# Patient Record
Sex: MALE | Race: WHITE | NOT HISPANIC OR LATINO | Employment: OTHER | URBAN - METROPOLITAN AREA
[De-identification: names, ages, dates, MRNs, and addresses within clinical notes are randomized per-mention and may not be internally consistent; named-entity substitution may affect disease eponyms.]

---

## 2019-05-16 ENCOUNTER — TELEPHONE (OUTPATIENT)
Dept: ENDOCRINOLOGY | Facility: CLINIC | Age: 29
End: 2019-05-16

## 2019-05-16 NOTE — TELEPHONE ENCOUNTER
----- Message from Alma Lucero sent at 5/15/2019  4:24 PM CDT -----  Contact: Yaima / Mendoza [phone]  Trans PT   PT called to schedule a NP appointment.   Self-Pay.

## 2019-09-19 ENCOUNTER — OFFICE VISIT (OUTPATIENT)
Dept: ENDOCRINOLOGY | Facility: CLINIC | Age: 29
End: 2019-09-19

## 2019-09-19 ENCOUNTER — LAB VISIT (OUTPATIENT)
Dept: LAB | Facility: HOSPITAL | Age: 29
End: 2019-09-19
Attending: INTERNAL MEDICINE

## 2019-09-19 VITALS
HEART RATE: 74 BPM | HEIGHT: 78 IN | BODY MASS INDEX: 24.03 KG/M2 | WEIGHT: 207.69 LBS | SYSTOLIC BLOOD PRESSURE: 124 MMHG | DIASTOLIC BLOOD PRESSURE: 90 MMHG

## 2019-09-19 LAB
ALBUMIN SERPL BCP-MCNC: 4.3 G/DL (ref 3.5–5.2)
ALP SERPL-CCNC: 71 U/L (ref 55–135)
ALT SERPL W/O P-5'-P-CCNC: 14 U/L (ref 10–44)
ANION GAP SERPL CALC-SCNC: 12 MMOL/L (ref 8–16)
AST SERPL-CCNC: 15 U/L (ref 10–40)
BILIRUB SERPL-MCNC: 0.5 MG/DL (ref 0.1–1)
BUN SERPL-MCNC: 9 MG/DL (ref 6–20)
CALCIUM SERPL-MCNC: 9.8 MG/DL (ref 8.7–10.5)
CHLORIDE SERPL-SCNC: 106 MMOL/L (ref 95–110)
CHOLEST SERPL-MCNC: 193 MG/DL (ref 120–199)
CHOLEST/HDLC SERPL: 4.1 {RATIO} (ref 2–5)
CO2 SERPL-SCNC: 24 MMOL/L (ref 23–29)
CREAT SERPL-MCNC: 1 MG/DL (ref 0.5–1.4)
ERYTHROCYTE [DISTWIDTH] IN BLOOD BY AUTOMATED COUNT: 11.3 % (ref 11.5–14.5)
EST. GFR  (AFRICAN AMERICAN): >60 ML/MIN/1.73 M^2
EST. GFR  (NON AFRICAN AMERICAN): >60 ML/MIN/1.73 M^2
GLUCOSE SERPL-MCNC: 98 MG/DL (ref 70–110)
HCT VFR BLD AUTO: 45.3 % (ref 40–54)
HDLC SERPL-MCNC: 47 MG/DL (ref 40–75)
HDLC SERPL: 24.4 % (ref 20–50)
HGB BLD-MCNC: 15 G/DL (ref 14–18)
LDLC SERPL CALC-MCNC: 119 MG/DL (ref 63–159)
MCH RBC QN AUTO: 31.6 PG (ref 27–31)
MCHC RBC AUTO-ENTMCNC: 33.1 G/DL (ref 32–36)
MCV RBC AUTO: 95 FL (ref 82–98)
NONHDLC SERPL-MCNC: 146 MG/DL
PLATELET # BLD AUTO: 197 K/UL (ref 150–350)
PMV BLD AUTO: 10.8 FL (ref 9.2–12.9)
POTASSIUM SERPL-SCNC: 4.2 MMOL/L (ref 3.5–5.1)
PROT SERPL-MCNC: 7.5 G/DL (ref 6–8.4)
RBC # BLD AUTO: 4.75 M/UL (ref 4.6–6.2)
SODIUM SERPL-SCNC: 142 MMOL/L (ref 136–145)
TRIGL SERPL-MCNC: 135 MG/DL (ref 30–150)
WBC # BLD AUTO: 5.55 K/UL (ref 3.9–12.7)

## 2019-09-19 PROCEDURE — 99999 PR PBB SHADOW E&M-EST. PATIENT-LVL III: ICD-10-PCS | Mod: PBBFAC,,, | Performed by: INTERNAL MEDICINE

## 2019-09-19 PROCEDURE — 80053 COMPREHEN METABOLIC PANEL: CPT

## 2019-09-19 PROCEDURE — 99999 PR PBB SHADOW E&M-EST. PATIENT-LVL III: CPT | Mod: PBBFAC,,, | Performed by: INTERNAL MEDICINE

## 2019-09-19 PROCEDURE — 80061 LIPID PANEL: CPT

## 2019-09-19 PROCEDURE — 36415 COLL VENOUS BLD VENIPUNCTURE: CPT

## 2019-09-19 PROCEDURE — 99213 OFFICE O/P EST LOW 20 MIN: CPT | Mod: PBBFAC | Performed by: INTERNAL MEDICINE

## 2019-09-19 PROCEDURE — 85027 COMPLETE CBC AUTOMATED: CPT

## 2019-09-19 PROCEDURE — 99204 PR OFFICE/OUTPT VISIT, NEW, LEVL IV, 45-59 MIN: ICD-10-PCS | Mod: S$PBB,KX,, | Performed by: INTERNAL MEDICINE

## 2019-09-19 PROCEDURE — 99204 OFFICE O/P NEW MOD 45 MIN: CPT | Mod: S$PBB,KX,, | Performed by: INTERNAL MEDICINE

## 2019-09-19 RX ORDER — SPIRONOLACTONE 50 MG/1
50 TABLET, FILM COATED ORAL 2 TIMES DAILY
Qty: 60 TABLET | Refills: 5 | Status: SHIPPED | OUTPATIENT
Start: 2019-09-19 | End: 2020-03-19

## 2019-09-19 RX ORDER — ESTRADIOL 1 MG/1
1 TABLET ORAL 2 TIMES DAILY
Qty: 60 TABLET | Refills: 5 | Status: SHIPPED | OUTPATIENT
Start: 2019-09-19 | End: 2020-03-19

## 2019-09-19 NOTE — ASSESSMENT & PLAN NOTE
Transwoman: gender incongruence   Reviewed therapy, side effects (both wanted and unwanted), possible adverse outcomes, expectations, compliance. Reviewed limited data on fertility, expect decline over time... consider sperm banking.   Patient encouraged to continue working with her therapist during the transition process.      Will start   estrogen replacement therapy    Estradiol 1 mg po bid  Will start  aldactone 50 mg bid           RTO in 3 months with labs .  Healthy lifestyle stressed  Discussed increased risk of dvt   Avoid prolonged immobility  D/c ert prior to any procedures

## 2019-09-19 NOTE — PROGRESS NOTES
"Subjective:      Patient ID: Garth Canales is a 29 y.o. male.    Chief Complaint:  Gender dysphoria      History of Present Illness  With regards to her gender incongruence care:    Has known since her teenage years     Gender identity - female  Therapist at the time hormones were started:  Aniket rodriguez   Letter was provided attesting to readiness for ERT      Gender Surgeries - none, but interested at this time   May be interested in vocal surgery        Fertility concerns - not  Interested     Has not started cross hormone therapy:        Social:  Work -  - works from home   Family -   - she is here is supportive        Anxiety and depression are stable - was dx in her teenage year   No self harm     New concerns today:  None            Review of Systems   Constitutional: Negative for unexpected weight change.   Eyes: Negative for visual disturbance.   Respiratory: Negative for shortness of breath.    Cardiovascular: Negative for chest pain.   Gastrointestinal: Negative for abdominal pain.   Musculoskeletal: Negative for myalgias.   Skin: Negative for wound.   Neurological: Negative for headaches.   Hematological: Does not bruise/bleed easily.   Psychiatric/Behavioral: Negative for sleep disturbance.       Objective:     BP (!) 124/90   Pulse 74   Ht 6' 6" (1.981 m)   Wt 94.2 kg (207 lb 10.8 oz)   BMI 24.00 kg/m²   BP Readings from Last 3 Encounters:   09/19/19 (!) 124/90     Wt Readings from Last 1 Encounters:   09/19/19 1110 94.2 kg (207 lb 10.8 oz)     Body mass index is 24 kg/m².      Physical Exam   Constitutional: He appears well-developed.   HENT:   Right Ear: External ear normal.   Left Ear: External ear normal.   Nose: Nose normal.   Hearing normal    Neck: No tracheal deviation present. No thyromegaly present.   Cardiovascular: Normal rate.   No murmur heard.  Pulmonary/Chest: Effort normal and breath sounds normal.   Abdominal: Soft. He exhibits no mass.   No hernia noted "   Musculoskeletal: He exhibits no edema.   Neurological: He is alert. No cranial nerve deficit or sensory deficit. Coordination and gait normal.        Skin: No rash noted.   No nodules   Psychiatric: He has a normal mood and affect. Judgment normal.   Vitals reviewed.             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 Reviewed limited data on fertility, expect decline over time... consider sperm banking.   Patient encouraged to continue working with her therapist during the transition process.      Will start   estrogen replacement therapy    Estradiol 1 mg po bid  Will start  aldactone 50 mg bid           RTO in 3 months with labs .  Healthy lifestyle stressed  Discussed increased risk of dvt   Avoid prolonged immobility  D/c ert prior to any procedures

## 2019-12-11 ENCOUNTER — PATIENT MESSAGE (OUTPATIENT)
Dept: ENDOCRINOLOGY | Facility: CLINIC | Age: 29
End: 2019-12-11

## 2020-03-19 RX ORDER — ESTRADIOL 1 MG/1
TABLET ORAL
Qty: 60 TABLET | Refills: 5 | Status: SHIPPED | OUTPATIENT
Start: 2020-03-19 | End: 2020-09-21

## 2020-03-19 RX ORDER — SPIRONOLACTONE 50 MG/1
TABLET, FILM COATED ORAL
Qty: 60 TABLET | Refills: 5 | Status: SHIPPED | OUTPATIENT
Start: 2020-03-19 | End: 2020-09-21

## 2020-09-21 ENCOUNTER — OFFICE VISIT (OUTPATIENT)
Dept: ENDOCRINOLOGY | Facility: CLINIC | Age: 30
End: 2020-09-21

## 2020-09-21 PROCEDURE — 99213 PR OFFICE/OUTPT VISIT, EST, LEVL III, 20-29 MIN: ICD-10-PCS | Mod: 95,KX,, | Performed by: INTERNAL MEDICINE

## 2020-09-21 PROCEDURE — 99213 OFFICE O/P EST LOW 20 MIN: CPT | Mod: 95,KX,, | Performed by: INTERNAL MEDICINE

## 2020-09-21 RX ORDER — SPIRONOLACTONE 50 MG/1
50 TABLET, FILM COATED ORAL 2 TIMES DAILY
Qty: 180 TABLET | Refills: 3 | Status: SHIPPED | OUTPATIENT
Start: 2020-09-21 | End: 2021-03-19 | Stop reason: SDUPTHER

## 2020-09-21 RX ORDER — ESTRADIOL 1 MG/1
1.5 TABLET ORAL 2 TIMES DAILY
Qty: 90 TABLET | Refills: 5 | Status: SHIPPED | OUTPATIENT
Start: 2020-09-21 | End: 2020-12-22

## 2020-09-21 NOTE — PROGRESS NOTES
Subjective:      Patient ID: Garth Canales is a 30 y.o. male.    Chief Complaint:  Gender     History of Present Illness  With regards to her gender incongruence care:  Looking into changing name and gender marker     Has known since her teenage years     Gender identity - female  Therapist at the time hormones were started:  Aniket rodriguez   Letter was provided attesting to readiness for ERT      Gender Surgeries - none, but interested at this time   May be interested in vocal surgery        Fertility concerns - not  Interested      started cross hormone therapy 9/2020:    estrogen 1 mg bid  aldactone 50  mg bid     She is happy with the changes   Less sweaty hands   breast tenderness and growth   Still able to perform sexually         Social:  Work -  - works from home   Family -   - she is supportive        Anxiety and depression are stable - was dx in her teenage year   No self harm     New concerns today:  None        Review of Systems   Constitutional: Negative for unexpected weight change.   Eyes: Negative for visual disturbance.   Respiratory: Negative for shortness of breath.    Cardiovascular: Negative for chest pain.   Gastrointestinal: Negative for abdominal pain.   Musculoskeletal: Negative for myalgias.   Skin: Negative for wound.   Neurological: Negative for headaches.   Hematological: Does not bruise/bleed easily.   Psychiatric/Behavioral: Negative for sleep disturbance.       Objective:     There were no vitals taken for this visit.  BP Readings from Last 3 Encounters:   09/19/19 (!) 124/90     Wt Readings from Last 1 Encounters:   09/19/19 1110 94.2 kg (207 lb 10.8 oz)     There is no height or weight on file to calculate BMI.      Physical Exam  Psychiatric:         Attention and Perception: Attention normal.         Mood and Affect: Mood normal.         Speech: Speech normal.         Behavior: Behavior normal.         Thought Content: Thought content normal.         Cognition and  Memory: Cognition normal.         Judgment: Judgment normal.                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 Reviewed limited data on fertility, expect decline over time... consider sperm banking.   Patient encouraged to continue working with her therapist during the transition process.      Will increase estrogen replacement therapy  - she does not want big changes so will increase to 1.5 mg twice a day  Continue  aldactone 50 mg bid   Lab soon        Visit in 3 months with labs .  Healthy lifestyle stressed  Discussed increased risk of dvt   Avoid prolonged immobility  D/c ert prior to any procedures        Will send information on changing name and gender markers on 's license      The patient location is: home  The chief complaint leading to consultation is: gender     Visit type: audiovisual    Face to Face time with patient: 20 minutes of total time spent on the encounter, which includes face to face time and non-face to face time preparing to see the patient (eg, review of tests), Obtaining and/or reviewing separately obtained history, Documenting clinical information in the electronic or other health record, Independently interpreting results (not separately reported) and communicating results to the patient/family/caregiver, or Care coordination (not separately reported).         Each patient to whom he or she provides medical services by telemedicine is:  (1) informed of the relationship between the physician and patient and the respective role of any other health care provider with respect to management of the patient; and (2) notified that he or she may decline to receive medical services by telemedicine and may withdraw from such care at any time.

## 2020-09-21 NOTE — PATIENT INSTRUCTIONS
Thank you for completing a virtual visit with me!     Per our conversation your medication changes are as follows:  Increase your estrogen it to 1.5 mg twice a day.  See below for regulations and useful web sites regarding name change and gender marker change in [place]    We will plan a telemedicine or an in-clinic visit in 3 months with labs prior to that appointment.    My staff will contact you to schedule the above.     Please let me know if you have any other questions.    Thank you,  Yoko Perrin MD             [place] Name Change Laws   To obtain a legal name change in [place], an applicant must submit a petition to the court. No letter from me is required.     [place] Drivers License Policy & Procedures   In order to update the name and/or gender on a [place] ID, the applicant must submit:  1. To change the name, a court order certifying the name change.  2. To change the gender, a statement signed by me stating that the applicant has undergone a successful gender change/reassignment.    [place] Birth Certificate Laws   [place] will only update the gender marker on a certificate of birth upon receipt of a court order certifying gender change.      [email or web address]    [email or web address]

## 2020-09-21 NOTE — ASSESSMENT & PLAN NOTE
Transwoman: gender incongruence   Reviewed therapy, side effects (both wanted and unwanted), possible adverse outcomes, expectations, compliance. Reviewed limited data on fertility, expect decline over time... consider sperm banking.   Patient encouraged to continue working with her therapist during the transition process.      Will increase estrogen replacement therapy  - she does not want big changes so will increase to 1.5 mg twice a day  Continue  aldactone 50 mg bid   Lab soon        Visit in 3 months with labs .  Healthy lifestyle stressed  Discussed increased risk of dvt   Avoid prolonged immobility  D/c ert prior to any procedures        Will send information on changing name and gender markers on 's license

## 2020-09-23 ENCOUNTER — PATIENT MESSAGE (OUTPATIENT)
Dept: ENDOCRINOLOGY | Facility: CLINIC | Age: 30
End: 2020-09-23

## 2020-09-23 NOTE — TELEPHONE ENCOUNTER
Spoke with [facility] confirmed they filled 3 months for spirolactone and only 1 month estradiol therefore the amount is different.

## 2020-12-15 ENCOUNTER — LAB VISIT (OUTPATIENT)
Dept: LAB | Facility: HOSPITAL | Age: 30
End: 2020-12-15
Attending: INTERNAL MEDICINE

## 2020-12-15 LAB
ALBUMIN SERPL BCP-MCNC: 4.3 G/DL (ref 3.5–5.2)
ALP SERPL-CCNC: 66 U/L (ref 55–135)
ALT SERPL W/O P-5'-P-CCNC: 11 U/L (ref 10–44)
ANION GAP SERPL CALC-SCNC: 8 MMOL/L (ref 8–16)
AST SERPL-CCNC: 11 U/L (ref 10–40)
BILIRUB SERPL-MCNC: 0.4 MG/DL (ref 0.1–1)
BUN SERPL-MCNC: 10 MG/DL (ref 6–20)
CALCIUM SERPL-MCNC: 9.8 MG/DL (ref 8.7–10.5)
CHLORIDE SERPL-SCNC: 103 MMOL/L (ref 95–110)
CHOLEST SERPL-MCNC: 196 MG/DL (ref 120–199)
CHOLEST/HDLC SERPL: 4.5 {RATIO} (ref 2–5)
CO2 SERPL-SCNC: 29 MMOL/L (ref 23–29)
CREAT SERPL-MCNC: 1 MG/DL (ref 0.5–1.4)
EST. GFR  (AFRICAN AMERICAN): >60 ML/MIN/1.73 M^2
EST. GFR  (NON AFRICAN AMERICAN): >60 ML/MIN/1.73 M^2
ESTRADIOL SERPL-MCNC: 59 PG/ML (ref 11–44)
GLUCOSE SERPL-MCNC: 91 MG/DL (ref 70–110)
HDLC SERPL-MCNC: 44 MG/DL (ref 40–75)
HDLC SERPL: 22.4 % (ref 20–50)
LDLC SERPL CALC-MCNC: 109 MG/DL (ref 63–159)
NONHDLC SERPL-MCNC: 152 MG/DL
POTASSIUM SERPL-SCNC: 4.5 MMOL/L (ref 3.5–5.1)
PROT SERPL-MCNC: 7.7 G/DL (ref 6–8.4)
SODIUM SERPL-SCNC: 140 MMOL/L (ref 136–145)
TESTOST SERPL-MCNC: 484 NG/DL (ref 304–1227)
TRIGL SERPL-MCNC: 215 MG/DL (ref 30–150)

## 2020-12-15 PROCEDURE — 80061 LIPID PANEL: CPT

## 2020-12-15 PROCEDURE — 82670 ASSAY OF TOTAL ESTRADIOL: CPT

## 2020-12-15 PROCEDURE — 80053 COMPREHEN METABOLIC PANEL: CPT

## 2020-12-15 PROCEDURE — 84403 ASSAY OF TOTAL TESTOSTERONE: CPT

## 2020-12-15 PROCEDURE — 36415 COLL VENOUS BLD VENIPUNCTURE: CPT

## 2020-12-22 ENCOUNTER — OFFICE VISIT (OUTPATIENT)
Dept: ENDOCRINOLOGY | Facility: CLINIC | Age: 30
End: 2020-12-22

## 2020-12-22 ENCOUNTER — TELEPHONE (OUTPATIENT)
Dept: ENDOCRINOLOGY | Facility: CLINIC | Age: 30
End: 2020-12-22

## 2020-12-22 PROCEDURE — 99213 OFFICE O/P EST LOW 20 MIN: CPT | Mod: 95,KX,, | Performed by: INTERNAL MEDICINE

## 2020-12-22 PROCEDURE — 99213 PR OFFICE/OUTPT VISIT, EST, LEVL III, 20-29 MIN: ICD-10-PCS | Mod: 95,KX,, | Performed by: INTERNAL MEDICINE

## 2020-12-22 RX ORDER — ESTRADIOL 2 MG/1
2 TABLET ORAL 2 TIMES DAILY
Qty: 60 TABLET | Refills: 11 | Status: SHIPPED | OUTPATIENT
Start: 2020-12-22 | End: 2021-03-19 | Stop reason: SDUPTHER

## 2020-12-22 NOTE — ASSESSMENT & PLAN NOTE
Transwoman: gender incongruence   Reviewed therapy, side effects (both wanted and unwanted), possible adverse outcomes, expectations, compliance. Reviewed limited data on fertility, expect decline over time... consider sperm banking.        Will increase estrogen replacement therapy  - she does not want big changes so will increase to 2 mg twice a day  Continue  aldactone 50 mg bid        Visit in 3 months with labs .  Healthy lifestyle stressed  Discussed increased risk of dvt   Avoid prolonged immobility  D/c ert prior to any procedures        Will send information on changing name and gender markers on 's license    refer to voice

## 2020-12-22 NOTE — PROGRESS NOTES
Subjective:      Patient ID: Garth Canales is a 30 y.o. male.    Chief Complaint:  Gender     History of Present Illness  With regards to her gender incongruence care:  Looking into changing name and gender marker     Has known since her teenage years     Gender identity - female  Therapist at the time hormones were started:  Aniket rodriguez   Letter was provided attesting to readiness for ERT      Gender Surgeries - none, not interested at this time   May be interested in vocal surgery        Fertility concerns - not  Interested      started cross hormone therapy 9/2020:    estrogen 1.5 mg bid  aldactone 50  mg bid     She is happy with the changes - interested in going up on the estrogen but she would like to keep spironolactone the same      breast tenderness and growth   Still able to perform sexually         Social:  Work -  - works from home   Family -   - she is supportive        Anxiety and depression are stable - was dx in her teenage year   No self harm     New concerns today:  None        Review of Systems   Constitutional: Negative for unexpected weight change.   Eyes: Negative for visual disturbance.   Respiratory: Negative for shortness of breath.    Cardiovascular: Negative for chest pain.   Gastrointestinal: Negative for abdominal pain.   Musculoskeletal: Negative for myalgias.   Skin: Negative for wound.   Neurological: Negative for headaches.   Hematological: Does not bruise/bleed easily.   Psychiatric/Behavioral: Negative for sleep disturbance.       Objective:     There were no vitals taken for this visit.  BP Readings from Last 3 Encounters:   09/19/19 (!) 124/90     Wt Readings from Last 1 Encounters:   09/19/19 1110 94.2 kg (207 lb 10.8 oz)     There is no height or weight on file to calculate BMI.      Physical Exam  Psychiatric:         Attention and Perception: Attention normal.         Mood and Affect: Mood normal.         Speech: Speech normal.         Behavior: Behavior  normal.         Thought Content: Thought content normal.         Cognition and Memory: Cognition normal.         Judgment: Judgment normal.                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 Reviewed limited data on fertility, expect decline over time... consider sperm banking.        Will increase estrogen replacement therapy  - she does not want big changes so will increase to 2 mg twice a day  Continue  aldactone 50 mg bid        Visit in 3 months with labs .  Healthy lifestyle stressed  Discussed increased risk of dvt   Avoid prolonged immobility  D/c ert prior to any procedures        Will send information on changing name and gender markers on 's license    refer to voice      The patient location is: home  The chief complaint leading to consultation is: gender     Visit type: audiovisual    Face to Face time with patient: 20 minutes of total time spent on the encounter, which includes face to face time and non-face to face time preparing to see the patient (eg, review of tests), Obtaining and/or reviewing separately obtained history, Documenting clinical information in the electronic or other health record, Independently interpreting results (not separately reported) and communicating results to the patient/family/caregiver, or Care coordination (not separately reported).         Each patient to whom he or she provides medical services by telemedicine is:  (1) informed of the relationship between the physician and patient and the respective role of any other health care provider with respect to management of the patient; and (2) notified that he or she may decline to receive medical services by telemedicine and may withdraw from such care at any time.

## 2021-01-06 ENCOUNTER — CLINICAL SUPPORT (OUTPATIENT)
Dept: SPEECH THERAPY | Facility: HOSPITAL | Age: 31
End: 2021-01-06
Attending: INTERNAL MEDICINE

## 2021-01-06 PROCEDURE — 92524 BEHAVRAL QUALIT ANALYS VOICE: CPT | Mod: GN,95 | Performed by: SPEECH-LANGUAGE PATHOLOGIST

## 2021-01-18 ENCOUNTER — PATIENT MESSAGE (OUTPATIENT)
Dept: ENDOCRINOLOGY | Facility: CLINIC | Age: 31
End: 2021-01-18

## 2021-01-20 ENCOUNTER — CLINICAL SUPPORT (OUTPATIENT)
Dept: SPEECH THERAPY | Facility: HOSPITAL | Age: 31
End: 2021-01-20

## 2021-01-20 ENCOUNTER — PATIENT MESSAGE (OUTPATIENT)
Dept: ENDOCRINOLOGY | Facility: CLINIC | Age: 31
End: 2021-01-20

## 2021-01-20 PROCEDURE — 92507 TX SP LANG VOICE COMM INDIV: CPT | Mod: 95,GN | Performed by: SPEECH-LANGUAGE PATHOLOGIST

## 2021-01-25 ENCOUNTER — PATIENT MESSAGE (OUTPATIENT)
Dept: ENDOCRINOLOGY | Facility: CLINIC | Age: 31
End: 2021-01-25

## 2021-02-24 ENCOUNTER — CLINICAL SUPPORT (OUTPATIENT)
Dept: SPEECH THERAPY | Facility: HOSPITAL | Age: 31
End: 2021-02-24

## 2021-02-24 DIAGNOSIS — R49.8 OTHER VOICE AND RESONANCE DISORDERS: ICD-10-CM

## 2021-02-24 PROCEDURE — 92507 TX SP LANG VOICE COMM INDIV: CPT | Mod: GN,95 | Performed by: SPEECH-LANGUAGE PATHOLOGIST

## 2021-03-05 ENCOUNTER — PATIENT MESSAGE (OUTPATIENT)
Dept: ENDOCRINOLOGY | Facility: CLINIC | Age: 31
End: 2021-03-05

## 2021-03-19 ENCOUNTER — OFFICE VISIT (OUTPATIENT)
Dept: ENDOCRINOLOGY | Facility: CLINIC | Age: 31
End: 2021-03-19
Attending: INTERNAL MEDICINE

## 2021-03-19 PROCEDURE — 99213 OFFICE O/P EST LOW 20 MIN: CPT | Mod: 95,KX,, | Performed by: INTERNAL MEDICINE

## 2021-03-19 PROCEDURE — 99213 PR OFFICE/OUTPT VISIT, EST, LEVL III, 20-29 MIN: ICD-10-PCS | Mod: 95,KX,, | Performed by: INTERNAL MEDICINE

## 2021-03-19 RX ORDER — SPIRONOLACTONE 50 MG/1
50 TABLET, FILM COATED ORAL 2 TIMES DAILY
Qty: 180 TABLET | Refills: 3 | Status: SHIPPED | OUTPATIENT
Start: 2021-03-19 | End: 2021-11-01

## 2021-03-19 RX ORDER — ESTRADIOL 2 MG/1
2 TABLET ORAL 2 TIMES DAILY
Qty: 180 TABLET | Refills: 3 | Status: SHIPPED | OUTPATIENT
Start: 2021-03-19 | End: 2021-12-20 | Stop reason: SDUPTHER

## 2021-04-07 ENCOUNTER — CLINICAL SUPPORT (OUTPATIENT)
Dept: SPEECH THERAPY | Facility: HOSPITAL | Age: 31
End: 2021-04-07

## 2021-04-07 DIAGNOSIS — R49.8 OTHER VOICE AND RESONANCE DISORDERS: ICD-10-CM

## 2021-04-07 PROCEDURE — 92507 TX SP LANG VOICE COMM INDIV: CPT | Mod: GN,95 | Performed by: SPEECH-LANGUAGE PATHOLOGIST

## 2021-04-29 ENCOUNTER — PATIENT MESSAGE (OUTPATIENT)
Dept: RESEARCH | Facility: HOSPITAL | Age: 31
End: 2021-04-29

## 2021-11-01 RX ORDER — SPIRONOLACTONE 50 MG/1
TABLET, FILM COATED ORAL
Qty: 180 TABLET | Refills: 1 | Status: SHIPPED | OUTPATIENT
Start: 2021-11-01 | End: 2021-12-20 | Stop reason: SDUPTHER

## 2021-12-21 RX ORDER — ESTRADIOL 2 MG/1
2 TABLET ORAL 2 TIMES DAILY
Qty: 180 TABLET | Refills: 3 | Status: SHIPPED | OUTPATIENT
Start: 2021-12-21

## 2021-12-21 RX ORDER — SPIRONOLACTONE 50 MG/1
50 TABLET, FILM COATED ORAL 2 TIMES DAILY
Qty: 180 TABLET | Refills: 1 | Status: SHIPPED | OUTPATIENT
Start: 2021-12-21

## 2024-07-13 NOTE — PATIENT INSTRUCTIONS
Problem: Pain  Goal: Verbalizes/displays adequate comfort level or baseline comfort level  Outcome: Progressing     Problem: Skin/Tissue Integrity  Goal: Absence of new skin breakdown  Description: 1.  Monitor for areas of redness and/or skin breakdown  2.  Assess vascular access sites hourly  3.  Every 4-6 hours minimum:  Change oxygen saturation probe site  4.  Every 4-6 hours:  If on nasal continuous positive airway pressure, respiratory therapy assess nares and determine need for appliance change or resting period.  Outcome: Progressing     Problem: ABCDS Injury Assessment  Goal: Absence of physical injury  Outcome: Progressing     Problem: Safety - Adult  Goal: Free from fall injury  Outcome: Progressing      Thank you for completing a virtual visit with me!     Per our conversation your medication changes are as follows:  Increase the estrogen to 2 mg twice a day    We will plan a telemedicine or an in-clinic visit in 3 months with labs prior to that appointment.    See information below regarding name change in gender marker change.    My staff will contact you to schedule the above.     Please let me know if you have any other questions.    Thank you,  Yoko Perrin MD             [place] Name Change Laws   To obtain a legal name change in [place], an applicant must submit a petition to the court. No letter from me is required.     [place] Drivers License Policy & Procedures   In order to update the name and/or gender on a [place] ID, the applicant must submit:  1. To change the name, a court order certifying the name change.  2. To change the gender, a statement signed by me stating that the applicant has undergone a successful gender change/reassignment.    [place] Birth Certificate Laws   [place] will only update the gender marker on a certificate of birth upon receipt of a court order certifying gender change.      [email or web address]/co-[email or web address]    [email or web address]